# Patient Record
Sex: FEMALE | Race: WHITE | NOT HISPANIC OR LATINO | URBAN - METROPOLITAN AREA
[De-identification: names, ages, dates, MRNs, and addresses within clinical notes are randomized per-mention and may not be internally consistent; named-entity substitution may affect disease eponyms.]

---

## 2017-01-09 ENCOUNTER — IMPORTED ENCOUNTER (OUTPATIENT)
Dept: URBAN - METROPOLITAN AREA CLINIC 50 | Facility: CLINIC | Age: 62
End: 2017-01-09

## 2017-02-10 ENCOUNTER — IMPORTED ENCOUNTER (OUTPATIENT)
Dept: URBAN - METROPOLITAN AREA CLINIC 50 | Facility: CLINIC | Age: 62
End: 2017-02-10

## 2017-02-14 ENCOUNTER — IMPORTED ENCOUNTER (OUTPATIENT)
Dept: URBAN - METROPOLITAN AREA CLINIC 50 | Facility: CLINIC | Age: 62
End: 2017-02-14

## 2017-08-15 ENCOUNTER — IMPORTED ENCOUNTER (OUTPATIENT)
Dept: URBAN - METROPOLITAN AREA CLINIC 50 | Facility: CLINIC | Age: 62
End: 2017-08-15

## 2017-08-30 ENCOUNTER — IMPORTED ENCOUNTER (OUTPATIENT)
Dept: URBAN - METROPOLITAN AREA CLINIC 50 | Facility: CLINIC | Age: 62
End: 2017-08-30

## 2018-03-13 ENCOUNTER — IMPORTED ENCOUNTER (OUTPATIENT)
Dept: URBAN - METROPOLITAN AREA CLINIC 50 | Facility: CLINIC | Age: 63
End: 2018-03-13

## 2018-03-19 ENCOUNTER — IMPORTED ENCOUNTER (OUTPATIENT)
Dept: URBAN - METROPOLITAN AREA CLINIC 50 | Facility: CLINIC | Age: 63
End: 2018-03-19

## 2018-03-27 ENCOUNTER — IMPORTED ENCOUNTER (OUTPATIENT)
Dept: URBAN - METROPOLITAN AREA CLINIC 50 | Facility: CLINIC | Age: 63
End: 2018-03-27

## 2018-04-17 ENCOUNTER — IMPORTED ENCOUNTER (OUTPATIENT)
Dept: URBAN - METROPOLITAN AREA CLINIC 50 | Facility: CLINIC | Age: 63
End: 2018-04-17

## 2018-04-18 ENCOUNTER — IMPORTED ENCOUNTER (OUTPATIENT)
Dept: URBAN - METROPOLITAN AREA CLINIC 50 | Facility: CLINIC | Age: 63
End: 2018-04-18

## 2018-04-24 ENCOUNTER — IMPORTED ENCOUNTER (OUTPATIENT)
Dept: URBAN - METROPOLITAN AREA CLINIC 50 | Facility: CLINIC | Age: 63
End: 2018-04-24

## 2018-04-25 ENCOUNTER — IMPORTED ENCOUNTER (OUTPATIENT)
Dept: URBAN - METROPOLITAN AREA CLINIC 50 | Facility: CLINIC | Age: 63
End: 2018-04-25

## 2018-05-10 ENCOUNTER — IMPORTED ENCOUNTER (OUTPATIENT)
Dept: URBAN - METROPOLITAN AREA CLINIC 50 | Facility: CLINIC | Age: 63
End: 2018-05-10

## 2018-05-10 NOTE — PATIENT DISCUSSION
"""S/P IOL OD: Tecnis ZCB00 22.5 (ORA) +ORA/Femto +TM/Omidria +ECP. Continue post operative instructions and drops per schedule.  Continue glaucoma drops as prescribed."""

## 2018-05-18 ENCOUNTER — IMPORTED ENCOUNTER (OUTPATIENT)
Dept: URBAN - METROPOLITAN AREA CLINIC 50 | Facility: CLINIC | Age: 63
End: 2018-05-18

## 2018-05-23 ENCOUNTER — IMPORTED ENCOUNTER (OUTPATIENT)
Dept: URBAN - METROPOLITAN AREA CLINIC 50 | Facility: CLINIC | Age: 63
End: 2018-05-23

## 2018-05-23 NOTE — PATIENT DISCUSSION
"""S/P IOL OS: Tecnis ZCB00 22.5 +Femto/Arcs +Omidria +ECP. Continue post operative instructions and drops per schedule.  Continue glaucoma drops as prescribed."""

## 2018-05-29 ENCOUNTER — IMPORTED ENCOUNTER (OUTPATIENT)
Dept: URBAN - METROPOLITAN AREA CLINIC 50 | Facility: CLINIC | Age: 63
End: 2018-05-29

## 2018-06-01 ENCOUNTER — IMPORTED ENCOUNTER (OUTPATIENT)
Dept: URBAN - METROPOLITAN AREA CLINIC 50 | Facility: CLINIC | Age: 63
End: 2018-06-01

## 2018-06-26 ENCOUNTER — IMPORTED ENCOUNTER (OUTPATIENT)
Dept: URBAN - METROPOLITAN AREA CLINIC 50 | Facility: CLINIC | Age: 63
End: 2018-06-26

## 2018-06-26 NOTE — PATIENT DISCUSSION
"""S/P IOL OU: OD: Tecnis ZCB00 22.5 (ORA) +ORAFemtoOmidria +ECP. OS: Tecnis ZCB00 22.5/Arcs +ECP. MRx given today. "

## 2018-11-20 ENCOUNTER — IMPORTED ENCOUNTER (OUTPATIENT)
Dept: URBAN - METROPOLITAN AREA CLINIC 50 | Facility: CLINIC | Age: 63
End: 2018-11-20

## 2019-02-19 ENCOUNTER — IMPORTED ENCOUNTER (OUTPATIENT)
Dept: URBAN - METROPOLITAN AREA CLINIC 50 | Facility: CLINIC | Age: 64
End: 2019-02-19

## 2019-06-18 ENCOUNTER — IMPORTED ENCOUNTER (OUTPATIENT)
Dept: URBAN - METROPOLITAN AREA CLINIC 50 | Facility: CLINIC | Age: 64
End: 2019-06-18

## 2019-07-30 ENCOUNTER — IMPORTED ENCOUNTER (OUTPATIENT)
Dept: URBAN - METROPOLITAN AREA CLINIC 50 | Facility: CLINIC | Age: 64
End: 2019-07-30

## 2019-12-09 ENCOUNTER — IMPORTED ENCOUNTER (OUTPATIENT)
Dept: URBAN - METROPOLITAN AREA CLINIC 50 | Facility: CLINIC | Age: 64
End: 2019-12-09

## 2019-12-11 ENCOUNTER — IMPORTED ENCOUNTER (OUTPATIENT)
Dept: URBAN - METROPOLITAN AREA CLINIC 50 | Facility: CLINIC | Age: 64
End: 2019-12-11

## 2019-12-13 ENCOUNTER — IMPORTED ENCOUNTER (OUTPATIENT)
Dept: URBAN - METROPOLITAN AREA CLINIC 50 | Facility: CLINIC | Age: 64
End: 2019-12-13

## 2020-01-08 ENCOUNTER — IMPORTED ENCOUNTER (OUTPATIENT)
Dept: URBAN - METROPOLITAN AREA CLINIC 50 | Facility: CLINIC | Age: 65
End: 2020-01-08

## 2020-06-09 ENCOUNTER — IMPORTED ENCOUNTER (OUTPATIENT)
Dept: URBAN - METROPOLITAN AREA CLINIC 50 | Facility: CLINIC | Age: 65
End: 2020-06-09

## 2020-06-11 ENCOUNTER — IMPORTED ENCOUNTER (OUTPATIENT)
Dept: URBAN - METROPOLITAN AREA CLINIC 50 | Facility: CLINIC | Age: 65
End: 2020-06-11

## 2020-06-18 ENCOUNTER — IMPORTED ENCOUNTER (OUTPATIENT)
Dept: URBAN - METROPOLITAN AREA CLINIC 50 | Facility: CLINIC | Age: 65
End: 2020-06-18

## 2020-07-01 ENCOUNTER — IMPORTED ENCOUNTER (OUTPATIENT)
Dept: URBAN - METROPOLITAN AREA CLINIC 50 | Facility: CLINIC | Age: 65
End: 2020-07-01

## 2020-07-02 ENCOUNTER — IMPORTED ENCOUNTER (OUTPATIENT)
Dept: URBAN - METROPOLITAN AREA CLINIC 50 | Facility: CLINIC | Age: 65
End: 2020-07-02

## 2020-07-14 ENCOUNTER — IMPORTED ENCOUNTER (OUTPATIENT)
Dept: URBAN - METROPOLITAN AREA CLINIC 50 | Facility: CLINIC | Age: 65
End: 2020-07-14

## 2020-07-14 NOTE — PATIENT DISCUSSION
"""Hx of ECP OU.  Reassured patient that intraocular pressures (IOPs) are at target levels and other ""

## 2020-07-24 ENCOUNTER — IMPORTED ENCOUNTER (OUTPATIENT)
Dept: URBAN - METROPOLITAN AREA CLINIC 50 | Facility: CLINIC | Age: 65
End: 2020-07-24

## 2020-12-15 ENCOUNTER — IMPORTED ENCOUNTER (OUTPATIENT)
Dept: URBAN - METROPOLITAN AREA CLINIC 50 | Facility: CLINIC | Age: 65
End: 2020-12-15

## 2020-12-28 ENCOUNTER — IMPORTED ENCOUNTER (OUTPATIENT)
Dept: URBAN - METROPOLITAN AREA CLINIC 50 | Facility: CLINIC | Age: 65
End: 2020-12-28

## 2020-12-30 ENCOUNTER — IMPORTED ENCOUNTER (OUTPATIENT)
Dept: URBAN - METROPOLITAN AREA CLINIC 50 | Facility: CLINIC | Age: 65
End: 2020-12-30

## 2021-02-11 ENCOUNTER — IMPORTED ENCOUNTER (OUTPATIENT)
Dept: URBAN - METROPOLITAN AREA CLINIC 50 | Facility: CLINIC | Age: 66
End: 2021-02-11

## 2021-04-17 ASSESSMENT — TONOMETRY
OS_IOP_MMHG: 14
OS_IOP_MMHG: 13
OD_IOP_MMHG: 15
OS_IOP_MMHG: 20
OS_IOP_MMHG: 18
OS_IOP_MMHG: 23
OS_IOP_MMHG: 12
OS_IOP_MMHG: 15
OD_IOP_MMHG: 19
OD_IOP_MMHG: 22
OD_IOP_MMHG: 14
OD_IOP_MMHG: 12
OD_IOP_MMHG: 13
OD_IOP_MMHG: 20
OD_IOP_MMHG: 13
OD_IOP_MMHG: 16
OS_IOP_MMHG: 17
OD_IOP_MMHG: 24
OD_IOP_MMHG: 12
OS_IOP_MMHG: 13
OS_IOP_MMHG: 14
OD_IOP_MMHG: 16
OS_IOP_MMHG: 13
OD_IOP_MMHG: 15
OD_IOP_MMHG: 25
OD_IOP_MMHG: 16
OS_IOP_MMHG: 17
OS_IOP_MMHG: 15
OD_IOP_MMHG: 13
OS_IOP_MMHG: 13
OS_IOP_MMHG: 15
OS_IOP_MMHG: 12
OS_IOP_MMHG: 14
OS_IOP_MMHG: 13
OD_IOP_MMHG: 15
OD_IOP_MMHG: 15
OD_IOP_MMHG: 14
OS_IOP_MMHG: 13
OD_IOP_MMHG: 15
OD_IOP_MMHG: 13
OD_IOP_MMHG: 14
OD_IOP_MMHG: 12
OD_IOP_MMHG: 17
OD_IOP_MMHG: 12
OS_IOP_MMHG: 14
OD_IOP_MMHG: 13
OD_IOP_MMHG: 12
OS_IOP_MMHG: 15
OD_IOP_MMHG: 15
OS_IOP_MMHG: 16
OD_IOP_MMHG: 14
OD_IOP_MMHG: 13
OD_IOP_MMHG: 13
OS_IOP_MMHG: 19
OD_IOP_MMHG: 14
OD_IOP_MMHG: 14
OS_IOP_MMHG: 13
OS_IOP_MMHG: 14
OD_IOP_MMHG: 13
OD_IOP_MMHG: 14
OS_IOP_MMHG: 13
OD_IOP_MMHG: 17
OS_IOP_MMHG: 12
OS_IOP_MMHG: 17
OS_IOP_MMHG: 15
OS_IOP_MMHG: 12
OS_IOP_MMHG: 14
OS_IOP_MMHG: 14
OS_IOP_MMHG: 22
OD_IOP_MMHG: 12
OS_IOP_MMHG: 16
OD_IOP_MMHG: 23
OD_IOP_MMHG: 14
OD_IOP_MMHG: 17

## 2021-04-17 ASSESSMENT — PACHYMETRY
OS_CT_UM: 516
OD_CT_UM: 529
OS_CT_UM: 516
OD_CT_UM: 529
OS_CT_UM: 516
OS_CT_UM: 516
OD_CT_UM: 529
OS_CT_UM: 516
OD_CT_UM: 529
OS_CT_UM: 516
OD_CT_UM: 529
OS_CT_UM: 516
OD_CT_UM: 529
OS_CT_UM: 516
OD_CT_UM: 529
OS_CT_UM: 516
OD_CT_UM: 529
OS_CT_UM: 516
OD_CT_UM: 529
OS_CT_UM: 516
OD_CT_UM: 529
OS_CT_UM: 516
OD_CT_UM: 529
OD_CT_UM: 529
OS_CT_UM: 516
OS_CT_UM: 516
OD_CT_UM: 529
OS_CT_UM: 516
OD_CT_UM: 529
OD_CT_UM: 529
OS_CT_UM: 516
OD_CT_UM: 529
OS_CT_UM: 516
OD_CT_UM: 529
OS_CT_UM: 516
OD_CT_UM: 529
OD_CT_UM: 529
OS_CT_UM: 516
OD_CT_UM: 529
OD_CT_UM: 529
OS_CT_UM: 516
OD_CT_UM: 529
OS_CT_UM: 516

## 2021-04-17 ASSESSMENT — VISUAL ACUITY
OD_CC: 20/20-2
OS_BAT: 20/30
OD_CC: J1@ 15 IN
OD_BAT: 20/25
OS_OTHER: 20/40. 20/60.
OS_SC: 20/25
OS_CC: J1+@ 16 IN
OS_CC: 20/20-2
OS_OTHER: 20/40. 20/70.
OD_PH: 20/25
OD_SC: 20/40
OS_CC: 20/25+1
OD_SC: 20/20
OS_CC: 20/20
OD_OTHER: 20/25. 20/50.
OD_CC: J1+
OS_BAT: 20/30
OS_SC: 20/30
OD_BAT: 20/30
OS_CC: 20/20-1
OS_BAT: 20/30
OS_BAT: 20/40
OD_CC: J1+@ 16 IN
OD_CC: 20/20
OD_OTHER: 20/60. 20/80.
OD_BAT: 20/30
OD_SC: 20/50+
OD_CC: 20/25-2
OD_OTHER: 20/30. 20/50.
OS_PH: @ 15 IN
OS_SC: 20/25
OS_CC: 20/20
OS_CC: J1@ 15 IN
OS_SC: 20/50
OS_BAT: 20/40
OS_CC: J1+
OD_SC: 20/40
OD_PH: 20/25
OD_BAT: 20/60
OD_OTHER: 20/30. 20/60.
OD_CC: 20/40-2
OD_CC: 20/40
OS_CC: 20/20
OD_CC: 20/20
OD_SC: 20/25
OD_PH: @ 15 IN
OS_OTHER: 20/30. 20/50.
OS_OTHER: 20/30. 20/30.
OD_OTHER: 20/30. 20/60.
OS_CC: 20/20
OS_CC: 20/25-2
OS_SC: 20/30
OD_SC: 20/25
OS_PH: 20/25
OS_BAT: 20/30
OD_SC: 20/20
OS_SC: 20/20
OS_SC: 20/20
OS_CC: 20/30
OS_OTHER: 20/30. 20/30.
OS_CC: J1@ 16 IN
OD_CC: 20/20
OD_SC: 20/20
OD_CC: 20/20-1
OD_CC: J1@ 16 IN
OD_SC: 20/20
OD_SC: 20/40
OS_SC: 20/25
OS_SC: 20/30
OS_OTHER: 20/30. 20/80.
OD_BAT: 20/30
OD_CC: 20/25

## 2022-08-11 ENCOUNTER — PREPPED CHART (OUTPATIENT)
Dept: URBAN - METROPOLITAN AREA CLINIC 50 | Facility: CLINIC | Age: 67
End: 2022-08-11

## 2022-09-01 ENCOUNTER — COMPREHENSIVE EXAM (OUTPATIENT)
Dept: URBAN - METROPOLITAN AREA CLINIC 50 | Facility: CLINIC | Age: 67
End: 2022-09-01

## 2022-09-01 DIAGNOSIS — H04.123: ICD-10-CM

## 2022-09-01 DIAGNOSIS — H40.023: ICD-10-CM

## 2022-09-01 DIAGNOSIS — H35.3120: ICD-10-CM

## 2022-09-01 DIAGNOSIS — H52.4: ICD-10-CM

## 2022-09-01 PROCEDURE — 92014 COMPRE OPH EXAM EST PT 1/>: CPT

## 2022-09-01 PROCEDURE — 92134 CPTRZ OPH DX IMG PST SGM RTA: CPT

## 2022-09-01 PROCEDURE — 92015 DETERMINE REFRACTIVE STATE: CPT

## 2022-09-01 ASSESSMENT — VISUAL ACUITY
OD_CC: 20/40
OD_SC: 20/25
OS_SC: 20/20
OS_CC: 20/25
OU_CC: J1+
OU_CC: 20/25
OU_SC: 20/20

## 2022-09-01 ASSESSMENT — TONOMETRY
OS_IOP_MMHG: 13
OS_IOP_MMHG: 14
OD_IOP_MMHG: 14
OD_IOP_MMHG: 13

## 2022-09-01 ASSESSMENT — KERATOMETRY
OS_AXISANGLE2_DEGREES: 98
OD_K2POWER_DIOPTERS: 43.00
OD_K1POWER_DIOPTERS: 42.25
OD_AXISANGLE2_DEGREES: 088
OS_AXISANGLE_DEGREES: 008
OS_K2POWER_DIOPTERS: 43.25
OD_AXISANGLE_DEGREES: 178
OS_K1POWER_DIOPTERS: 42.75

## 2022-10-27 ENCOUNTER — DIAGNOSTICS ONLY (OUTPATIENT)
Dept: URBAN - METROPOLITAN AREA CLINIC 50 | Facility: CLINIC | Age: 67
End: 2022-10-27

## 2022-10-27 DIAGNOSIS — H40.023: ICD-10-CM

## 2022-10-27 PROCEDURE — 92133 CPTRZD OPH DX IMG PST SGM ON: CPT

## 2022-10-27 PROCEDURE — 92083 EXTENDED VISUAL FIELD XM: CPT

## 2022-10-27 ASSESSMENT — KERATOMETRY
OS_K2POWER_DIOPTERS: 43.25
OS_K1POWER_DIOPTERS: 42.75
OD_AXISANGLE_DEGREES: 178
OS_AXISANGLE2_DEGREES: 98
OD_K1POWER_DIOPTERS: 42.25
OD_AXISANGLE2_DEGREES: 088
OS_AXISANGLE_DEGREES: 008
OD_K2POWER_DIOPTERS: 43.00

## 2023-11-08 ENCOUNTER — FOLLOW UP (OUTPATIENT)
Dept: URBAN - METROPOLITAN AREA CLINIC 50 | Facility: LOCATION | Age: 68
End: 2023-11-08

## 2023-11-08 DIAGNOSIS — H40.023: ICD-10-CM

## 2023-11-08 PROCEDURE — 92133 CPTRZD OPH DX IMG PST SGM ON: CPT

## 2023-11-08 PROCEDURE — 92083 EXTENDED VISUAL FIELD XM: CPT

## 2023-11-08 PROCEDURE — 92012 INTRM OPH EXAM EST PATIENT: CPT

## 2023-11-08 ASSESSMENT — VISUAL ACUITY
OS_CC: 20/20
OD_CC: 20/25
OU_CC: 20/20

## 2023-11-08 ASSESSMENT — KERATOMETRY
OS_AXISANGLE_DEGREES: 008
OD_AXISANGLE2_DEGREES: 088
OD_K1POWER_DIOPTERS: 42.25
OD_AXISANGLE_DEGREES: 178
OS_AXISANGLE2_DEGREES: 98
OS_K1POWER_DIOPTERS: 42.75
OS_K2POWER_DIOPTERS: 43.25
OD_K2POWER_DIOPTERS: 43.00

## 2023-11-08 ASSESSMENT — TONOMETRY
OD_IOP_MMHG: 12
OS_IOP_MMHG: 11
OD_IOP_MMHG: 11
OS_IOP_MMHG: 12

## 2024-12-12 ENCOUNTER — DIAGNOSTICS ONLY (OUTPATIENT)
Age: 69
End: 2024-12-12

## 2024-12-12 DIAGNOSIS — H40.023: ICD-10-CM

## 2024-12-12 PROCEDURE — 92083 EXTENDED VISUAL FIELD XM: CPT

## 2024-12-12 PROCEDURE — 92133 CPTRZD OPH DX IMG PST SGM ON: CPT

## 2025-01-09 ENCOUNTER — COMPREHENSIVE EXAM (OUTPATIENT)
Age: 70
End: 2025-01-09

## 2025-01-09 DIAGNOSIS — H16.223: ICD-10-CM

## 2025-01-09 DIAGNOSIS — H35.352: ICD-10-CM

## 2025-01-09 DIAGNOSIS — H35.3120: ICD-10-CM

## 2025-01-09 DIAGNOSIS — H40.023: ICD-10-CM

## 2025-01-09 PROCEDURE — 92134 CPTRZ OPH DX IMG PST SGM RTA: CPT

## 2025-01-09 PROCEDURE — 99214 OFFICE O/P EST MOD 30 MIN: CPT

## 2025-08-07 ENCOUNTER — EMERGENCY VISIT (OUTPATIENT)
Age: 70
End: 2025-08-07

## 2025-08-07 DIAGNOSIS — H16.223: ICD-10-CM

## 2025-08-07 PROCEDURE — 99213 OFFICE O/P EST LOW 20 MIN: CPT

## 2025-08-07 RX ORDER — OLOPATADINE HYDROCHLORIDE 2 MG/ML
1 SOLUTION OPHTHALMIC
Start: 2025-08-07

## 2025-08-07 RX ORDER — ERYTHROMYCIN 5 MG/G
OINTMENT OPHTHALMIC EVERY EVENING
Start: 2025-08-07